# Patient Record
(demographics unavailable — no encounter records)

---

## 2025-01-16 NOTE — HISTORY OF PRESENT ILLNESS
[FreeTextEntry6] : ESTABLISHED PT COUGH X A FEW DAYS, NOW MORE WET  FEVER TODAY TMAX 103 CHEST PAIN WITH COUGH NO POST TUSSIVE VOMITING

## 2025-01-22 NOTE — HISTORY OF PRESENT ILLNESS
[FreeTextEntry6] : ESTABLISHED PT FOLLOW UP ER VISIT WAS TREATED FOR CLINICAL PNEUMONIA1/16 , SEEN AT THE ER L[facility]Y PANDA FOR PERSISTENT COUGH AND POST TUSSIVE VOMITING CXR NEGATIVE TESTED FLU POSITIVE TREATED WITH DECADRON/ALBUTEROL/ATROVENT HERE FOR FOLLOW UP   DID ATTEND SCHOOL TODAY. ATE LUNCH TODAY  COUGHING BUT FEELS MUCH BETTER

## 2025-01-22 NOTE — PHYSICAL EXAM
[NL] : nonerythematous oropharynx [Toxic] : not toxic [No Abnormal Lymph Nodes Palpated] : no abnormal lymph nodes palpated [Warm, Well Perfused x4] : warm, well perfused x4 [Clear] : clear [FreeTextEntry7] : COARSE BREATH SOUNDS LEFT BASE

## 2025-06-27 NOTE — PHYSICAL EXAM
[Alert] : alert [Normocephalic] : normocephalic [Conjunctivae with no discharge] : conjunctivae with no discharge [No Discharge] : no discharge [Palate Intact] : palate intact [No Caries] : no caries [Supple, full passive range of motion] : supple, full passive range of motion [Clear to Auscultation Bilaterally] : clear to auscultation bilaterally [Regular Rate and Rhythm] : regular rate and rhythm [Normal S1, S2 present] : normal S1, S2 present [No Murmurs] : no murmurs [Soft] : soft [Normoactive Bowel Sounds] : normoactive bowel sounds [Ernie: ____] : Ernie [unfilled] [Ernie: _____] : Ernie [unfilled] [Patent] : patent [No Abnormal Lymph Nodes Palpated] : no abnormal lymph nodes palpated [No Gait Asymmetry] : no gait asymmetry [Normal Muscle Tone] : normal muscle tone [Straight] : straight [No Scoliosis] : no scoliosis [Cranial Nerves Grossly Intact] : cranial nerves grossly intact [No Rash or Lesions] : no rash or lesions [FreeTextEntry1] : BIG FOR AGE [FreeTextEntry5] : 20/20 OU [FreeTextEntry3] : PASSED OAE [de-identified] : GLOBULAR BREAST TISSUE

## 2025-06-27 NOTE — HISTORY OF PRESENT ILLNESS
[Mother] : mother [Normal] : Normal [Brushing teeth twice/d] : brushing teeth twice per day [Yes] : Patient goes to dentist yearly [Toothpaste] : Primary Fluoride Source: Toothpaste [Playtime (60 min/d)] : playtime 60 min a day [Appropiate parent-child-sibling interaction] : appropriate parent-child-sibling interaction [Grade ___] : Grade [unfilled] [No difficulties with Homework] : no difficulties with homework [No] : No cigarette smoke exposure [Appropriately restrained in motor vehicle] : appropriately restrained in motor vehicle [Supervised outdoor play] : supervised outdoor play [Up to date] : Up to date [FreeTextEntry7] : LAST WELL JUNE 2024 SEEN BY ENDO- DOES NOT THINK EARLY PUBERTY [de-identified] : ANXIETY  [de-identified] : HEALTHY DIET  [FreeTextEntry9] : [employer]  [de-identified] : [facility] [NO] : No

## 2025-06-27 NOTE — DISCUSSION/SUMMARY
[Normal Development] : development [No Elimination Concerns] : elimination [No Feeding Concerns] : feeding [No Skin Concerns] : skin [Normal Sleep Pattern] : sleep [BMI ___] : body mass index of [unfilled] [School] : school [Development and Mental Health] : development and mental health [Nutrition and Physical Activity] : nutrition and physical activity [Oral Health] : oral health [Safety] : safety [No Medication Changes] : No medication changes at this time [Patient] : patient [Mother] : mother [Full Activity without restrictions including Physical Education & Athletics] : Full Activity without restrictions including Physical Education & Athletics [I have examined the above-named student and completed the preparticipation physical evaluation. The athlete does not present apparent clinical contraindications to practice and participate in sport(s) as outlined above. A copy of the physical exam is on r] : I have examined the above-named student and completed the preparticipation physical evaluation. The athlete does not present apparent clinical contraindications to practice and participate in sport(s) as outlined above. A copy of the physical exam is on record in my office and can be made available to the school at the request of the parents. If conditions arise after the athlete has been cleared for participation, the physician may rescind the clearance until the problem is resolved and the potential consequences are completely explained to the athlete (and parents/guardians). [FreeTextEntry4] : DISCUSSED TECHNIQUES TO REDUCE ANXIETY [FreeTextEntry1] : NONE [de-identified] : NONE [FreeTextEntry3] : DMITRI VALDES

## 2025-06-27 NOTE — DISCUSSION/SUMMARY
[Normal Development] : development [No Elimination Concerns] : elimination [No Feeding Concerns] : feeding [No Skin Concerns] : skin [Normal Sleep Pattern] : sleep [BMI ___] : body mass index of [unfilled] [School] : school [Development and Mental Health] : development and mental health [Nutrition and Physical Activity] : nutrition and physical activity [Oral Health] : oral health [Safety] : safety [No Medication Changes] : No medication changes at this time [Patient] : patient [Mother] : mother [Full Activity without restrictions including Physical Education & Athletics] : Full Activity without restrictions including Physical Education & Athletics [I have examined the above-named student and completed the preparticipation physical evaluation. The athlete does not present apparent clinical contraindications to practice and participate in sport(s) as outlined above. A copy of the physical exam is on r] : I have examined the above-named student and completed the preparticipation physical evaluation. The athlete does not present apparent clinical contraindications to practice and participate in sport(s) as outlined above. A copy of the physical exam is on record in my office and can be made available to the school at the request of the parents. If conditions arise after the athlete has been cleared for participation, the physician may rescind the clearance until the problem is resolved and the potential consequences are completely explained to the athlete (and parents/guardians). [FreeTextEntry4] : DISCUSSED TECHNIQUES TO REDUCE ANXIETY [FreeTextEntry1] : NONE [de-identified] : NONE [FreeTextEntry3] : DMITRI VALDES

## 2025-06-27 NOTE — PHYSICAL EXAM
[Alert] : alert [Normocephalic] : normocephalic [Conjunctivae with no discharge] : conjunctivae with no discharge [No Discharge] : no discharge [Palate Intact] : palate intact [No Caries] : no caries [Supple, full passive range of motion] : supple, full passive range of motion [Clear to Auscultation Bilaterally] : clear to auscultation bilaterally [Regular Rate and Rhythm] : regular rate and rhythm [Normal S1, S2 present] : normal S1, S2 present [No Murmurs] : no murmurs [Soft] : soft [Normoactive Bowel Sounds] : normoactive bowel sounds [Ernie: ____] : Ernie [unfilled] [Ernie: _____] : Ernie [unfilled] [Patent] : patent [No Abnormal Lymph Nodes Palpated] : no abnormal lymph nodes palpated [No Gait Asymmetry] : no gait asymmetry [Normal Muscle Tone] : normal muscle tone [Straight] : straight [No Scoliosis] : no scoliosis [Cranial Nerves Grossly Intact] : cranial nerves grossly intact [No Rash or Lesions] : no rash or lesions [FreeTextEntry1] : BIG FOR AGE [FreeTextEntry5] : 20/20 OU [FreeTextEntry3] : PASSED OAE [de-identified] : GLOBULAR BREAST TISSUE

## 2025-06-27 NOTE — HISTORY OF PRESENT ILLNESS
[Mother] : mother [Normal] : Normal [Brushing teeth twice/d] : brushing teeth twice per day [Yes] : Patient goes to dentist yearly [Toothpaste] : Primary Fluoride Source: Toothpaste [Playtime (60 min/d)] : playtime 60 min a day [Appropiate parent-child-sibling interaction] : appropriate parent-child-sibling interaction [Grade ___] : Grade [unfilled] [No difficulties with Homework] : no difficulties with homework [No] : No cigarette smoke exposure [Appropriately restrained in motor vehicle] : appropriately restrained in motor vehicle [Supervised outdoor play] : supervised outdoor play [Up to date] : Up to date [FreeTextEntry7] : LAST WELL JUNE 2024 SEEN BY ENDO- DOES NOT THINK EARLY PUBERTY [de-identified] : ANXIETY  [de-identified] : HEALTHY DIET  [FreeTextEntry9] : [employer]  [de-identified] : [facility] [NO] : No